# Patient Record
Sex: MALE | Race: WHITE | URBAN - METROPOLITAN AREA
[De-identification: names, ages, dates, MRNs, and addresses within clinical notes are randomized per-mention and may not be internally consistent; named-entity substitution may affect disease eponyms.]

---

## 2017-10-05 ENCOUNTER — OFFICE VISIT (OUTPATIENT)
Dept: ORTHOPEDIC SURGERY | Age: 27
End: 2017-10-05

## 2017-10-05 DIAGNOSIS — M25.551 RIGHT HIP PAIN: ICD-10-CM

## 2017-10-05 PROCEDURE — 99999 PR OFFICE/OUTPT VISIT,PROCEDURE ONLY: CPT | Performed by: ORTHOPAEDIC SURGERY

## 2017-10-05 NOTE — MR AVS SNAPSHOT
After Visit Summary             Roma Arvizu   10/5/2017 10:00 AM   Office Visit    Description:  Male : 1990   Provider:  Jhoan Norwood MD   Department[facility]  [facility] and Future Appointments         Below is a list of your follow-up and future appointments. This may not be a complete list as you may have made appointments directly with providers that we are not aware of or your providers may have made some for you. Please call your providers to confirm appointments. It is important to keep your appointments. Please bring your current insurance card, photo ID, co-pay, and all medication bottles to your appointment. If self-pay, payment is expected at the time of service. Information from Your Visit        Department     Name Address Phone Fax    [facility] [address]  [address] [place] [address] [phone]      You Were Seen for:         Comments    Right hip pain   [[id number]]            Medications and Orders      Allergies           Not on File      We Ordered/Performed the following           XR HIP 2-3 VW W PELVIS RIGHT     Scheduling Instructions:    #5         Result Summary for XR HIP 2-3 VW W PELVIS RIGHT      Result Information     Status          Final result (Exam End: 10/5/2017  8:24 AM)           10/5/2017  8:24 AM      Narrative & Impression           Radiology exam is complete. No Radiologist dictation. Please follow up with ordering provider. Additional Information        Basic Information     Date Of Birth Sex Race Ethnicity Preferred Language    1990 Male White Unavailable/Unknown English      Preventive Care        Date Due    HIV screening is recommended for all people regardless of risk factors  aged 15-65 years at least once (lifetime) who have never been HIV tested.  2004    Tetanus Combination Vaccine (1 - Tdap) 2008    Yearly Flu Vaccine (1) 2017 MyChart Signup           [employer] allows you to send messages to your doctor, view your test results, renew your prescriptions, schedule appointments, view visit notes, and more. How Do I Sign Up? 1. In your Internet browser, go to [email or web address]  2. Click on the Sign Up Now link in the Sign In box. You will see the New Member Sign Up page. 3. Enter your [employer] Access Code exactly as it appears below. You will not need to use this code after youve completed the sign-up process. If you do not sign up before the expiration date, you must request a new code. [employer] Access Code: Kaylan Rodriguez  Expires: 12/4/2017  8:52 AM    4. Enter your Social Security Number ([id number]) and Date of Birth (mm/dd/yyyy) as indicated and click Submit. You will be taken to the next sign-up page. 5. Create a [employer] ID. This will be your [employer] login ID and cannot be changed, so think of one that is secure and easy to remember. 6. Create a [employer] password. You can change your password at any time. 7. Enter your Password Reset Question and Answer. This can be used at a later time if you forget your password. 8. Enter your e-mail address. You will receive e-mail notification when new information is available in [address]. 9. Click Sign Up. You can now view your medical record. Additional Information  If you have questions, please contact the physician practice where you receive care. Remember, [employer] is NOT to be used for urgent needs. For medical emergencies, dial 911. For questions regarding your [employer] account call [phone]. If you have a clinical question, please call your doctor's office.